# Patient Record
Sex: FEMALE | Race: WHITE | NOT HISPANIC OR LATINO | Employment: OTHER | URBAN - NONMETROPOLITAN AREA
[De-identification: names, ages, dates, MRNs, and addresses within clinical notes are randomized per-mention and may not be internally consistent; named-entity substitution may affect disease eponyms.]

---

## 2019-03-25 ENCOUNTER — OFFICE VISIT (OUTPATIENT)
Dept: OBSTETRICS AND GYNECOLOGY | Facility: CLINIC | Age: 69
End: 2019-03-25

## 2019-03-25 VITALS
WEIGHT: 120 LBS | HEIGHT: 62 IN | SYSTOLIC BLOOD PRESSURE: 98 MMHG | DIASTOLIC BLOOD PRESSURE: 60 MMHG | BODY MASS INDEX: 22.08 KG/M2

## 2019-03-25 DIAGNOSIS — N95.2 ATROPHIC VULVOVAGINITIS: ICD-10-CM

## 2019-03-25 DIAGNOSIS — Z12.31 BREAST CANCER SCREENING BY MAMMOGRAM: ICD-10-CM

## 2019-03-25 DIAGNOSIS — Z01.419 ENCOUNTER FOR GYNECOLOGICAL EXAMINATION WITH PAPANICOLAOU SMEAR OF CERVIX: Primary | ICD-10-CM

## 2019-03-25 DIAGNOSIS — N64.59 OTHER SIGNS AND SYMPTOMS IN BREAST: ICD-10-CM

## 2019-03-25 DIAGNOSIS — N64.4 PAIN OF LEFT BREAST: ICD-10-CM

## 2019-03-25 PROCEDURE — 87624 HPV HI-RISK TYP POOLED RSLT: CPT | Performed by: OBSTETRICS & GYNECOLOGY

## 2019-03-25 PROCEDURE — G0123 SCREEN CERV/VAG THIN LAYER: HCPCS | Performed by: OBSTETRICS & GYNECOLOGY

## 2019-03-25 RX ORDER — LEVALBUTEROL TARTRATE 45 MCG
HFA AEROSOL WITH ADAPTER (GRAM) INHALATION
Refills: 1 | COMMUNITY
Start: 2018-12-18

## 2019-03-25 RX ORDER — ESCITALOPRAM OXALATE 10 MG/1
TABLET ORAL
Refills: 3 | COMMUNITY
Start: 2019-03-21

## 2019-03-27 LAB
GEN CATEG CVX/VAG CYTO-IMP: NORMAL
LAB AP CASE REPORT: NORMAL
LAB AP GYN ADDITIONAL INFORMATION: NORMAL
LAB AP GYN OTHER FINDINGS: NORMAL
PATH INTERP SPEC-IMP: NORMAL
STAT OF ADQ CVX/VAG CYTO-IMP: NORMAL

## 2019-03-30 PROBLEM — Z12.31 BREAST CANCER SCREENING BY MAMMOGRAM: Status: ACTIVE | Noted: 2019-03-30

## 2019-03-30 PROBLEM — N64.4 PAIN OF LEFT BREAST: Status: ACTIVE | Noted: 2019-03-30

## 2019-03-30 PROBLEM — N95.2 ATROPHIC VULVOVAGINITIS: Status: ACTIVE | Noted: 2019-03-30

## 2019-03-30 PROBLEM — N64.59 OTHER SIGNS AND SYMPTOMS IN BREAST: Status: ACTIVE | Noted: 2019-03-30

## 2019-03-30 PROBLEM — Z01.419 ENCOUNTER FOR GYNECOLOGICAL EXAMINATION WITH PAPANICOLAOU SMEAR OF CERVIX: Status: ACTIVE | Noted: 2019-03-30

## 2019-03-30 LAB — HPV I/H RISK 4 DNA CVX QL PROBE+SIG AMP: NEGATIVE

## 2019-03-31 NOTE — PROGRESS NOTES
Leah Jessica is a 69 y.o. y/o female.     Chief Complaint is here requesting Pap smear    HPI:   69 y.o. y/o .  Patient is here requesting Pap smear she is status post hysterectomy current recommendations reviewed.  She is having some dyspareunia he complains of pain in her left breast without mass for about 6 weeks          Review of Systems   ROS:  CNS: No history of brain malignancy  HEENT: No history of throat cancer  Eye: No history of retinal cancer  Pulmonary: No history of lung cancer                                                                                 Cardiovascular: No history of cardiac tumors  Gastrointestinal: No history of small bowel tumors  Renal: No history of kidney  Musculoskeletal: No history of smooth muscle tumors  Lymphatics: No history of of Hodgkin's disease  Endocrine: No history of thyroid malignancy    The following portions of the patient's history were reviewed and updated as appropriate: allergies, current medications, past family history, past medical history, past social history, past surgical history and problem list.    Allergies   Allergen Reactions   • Avelox [Moxifloxacin Hcl] Anaphylaxis   • Albuterol Other (See Comments)     Hurts heart per patient   • Biaxin [Clarithromycin] Nausea And Vomiting   • Contrast Dye Itching     IVP Dye   • Tape Hives   • Clindamycin/Lincomycin Rash   • Floxin Otic [Ofloxacin] Rash   • Histamine Rash        Outpatient Medications Prior to Visit   Medication Sig Dispense Refill   • escitalopram (LEXAPRO) 10 MG tablet   3   • XOPENEX HFA 45 MCG/ACT inhaler   1     No facility-administered medications prior to visit.         The patient has a family history of   Family History   Problem Relation Age of Onset   • Breast cancer Sister         Past Medical History:   Diagnosis Date   • Breast cyst    • Fibrocystic breast         OB History      Para Term  AB Living    2 2 2          SAB TAB Ectopic Molar Multiple Live  "Births                          Social History     Socioeconomic History   • Marital status:      Spouse name: Not on file   • Number of children: Not on file   • Years of education: Not on file   • Highest education level: Not on file        Past Surgical History:   Procedure Laterality Date   • BREAST CYST EXCISION     • HYSTERECTOMY          Patient Active Problem List   Diagnosis   • Atrophic vulvovaginitis   • Breast cancer screening by mammogram   • Encounter for gynecological examination with Papanicolaou smear of cervix   • Pain of left breast   • Other signs and symptoms in breast         Documented Vitals    03/25/19 1155   BP: 98/60   Weight: 54.4 kg (120 lb)   Height: 157.5 cm (62\")        Body mass index is 21.95 kg/m².    Physical Exam  Constitutional: Appears to be in no acute distress; Eyes: sclera normal; Endocrine system: thyroid palpate is normal; Pulmonary system: lungs clear; Cardiovascular system: heart regular rate and rhythm; Gastrointestinal system: abdomen soft nontender, active bowel sounds; Urologic system: CVA negative; Psychiatric: appropriate insight; Neurologic: gait within normal limits female genital system external genitalia atrophic, vagina atrophic urethra atrophic bladder palpates is nontender, cuff well-healed, uterus not palpable, surgically absent right ovary not palpable left ovary not palpable, just I can find no masses in either breast there is some diffuse tenderness in the left breast    Assessment        Diagnosis Plan   1. Encounter for gynecological examination with Papanicolaou smear of cervix  Liquid-based Pap Smear, Screening    HPV DNA Probe, Direct - ThinPrep Vial,    HPV DNA Probe, Direct - ThinPrep Vial, Cervix   2. Breast cancer screening by mammogram  Mammo Screening Digital Tomosynthesis Bilateral With CAD    US Breast Left Complete   3. Pain of left breast     4. Other signs and symptoms in breast   US Breast Left Complete   5. Atrophic " vulvovaginitis           Plan       No orders of the defined types were placed in this encounter.          This document has been electronically signed by Aníbal Hendrickson MD on March 30, 2019 8:46 PM

## 2020-05-06 ENCOUNTER — OFFICE VISIT (OUTPATIENT)
Dept: OBSTETRICS AND GYNECOLOGY | Facility: CLINIC | Age: 70
End: 2020-05-06

## 2020-05-06 VITALS
DIASTOLIC BLOOD PRESSURE: 62 MMHG | HEIGHT: 62 IN | SYSTOLIC BLOOD PRESSURE: 98 MMHG | WEIGHT: 123 LBS | BODY MASS INDEX: 22.63 KG/M2

## 2020-05-06 DIAGNOSIS — N95.2 ATROPHIC VULVOVAGINITIS: Primary | ICD-10-CM

## 2020-05-06 PROCEDURE — 99213 OFFICE O/P EST LOW 20 MIN: CPT | Performed by: OBSTETRICS & GYNECOLOGY

## 2020-05-06 RX ORDER — ESTRADIOL 10 UG/1
1 INSERT VAGINAL 2 TIMES WEEKLY
Qty: 8 TABLET | Refills: 1 | Status: SHIPPED | OUTPATIENT
Start: 2020-05-07 | End: 2020-06-06

## 2020-05-06 RX ORDER — LEVOTHYROXINE SODIUM 75 MCG
75 TABLET ORAL DAILY
COMMUNITY
Start: 2020-03-18 | End: 2021-04-22

## 2020-05-06 RX ORDER — ZOLPIDEM TARTRATE 10 MG/1
10 TABLET ORAL
COMMUNITY
Start: 2020-03-04

## 2020-05-06 RX ORDER — CETIRIZINE HYDROCHLORIDE 10 MG/1
10 TABLET ORAL DAILY
COMMUNITY

## 2020-05-06 RX ORDER — CHLORAL HYDRATE 500 MG
1000 CAPSULE ORAL
COMMUNITY

## 2020-05-06 RX ORDER — MULTIPLE VITAMINS W/ MINERALS TAB 9MG-400MCG
1 TAB ORAL DAILY
COMMUNITY

## 2020-05-06 RX ORDER — ASPIRIN 81 MG/1
81 TABLET ORAL DAILY
COMMUNITY

## 2020-05-07 NOTE — PROGRESS NOTES
Leah Jessica is a 70 y.o. y/o female.     Chief Complaint: Vaginal dryness    HPI:   70 y.o. .  No LMP recorded. Patient is postmenopausal..  Patient complains of dryness in the vagina.  The dryness in the vagina is constant it is been getting progressively worse over about the last 6 months nothing seems to make it better she is tried Vaseline.  It makes intercourse very difficult          Review of Systems   ROS:  CNS: No history of brain malignancy.  HEENT: No history of throat cancer.  Eye: No history of retinal cancer.  Pulmonary: No history of lung cancer.                                                                                 Cardiovascular: No history of cardiac tumors.  Gastrointestinal: No history of small bowel tumors.  Renal: No history of kidney malignancy.  Musculoskeletal: No history of smooth muscle tumors.  Lymphatics: No history of Hodgkin's disease.  Endocrine: No history of thyroid malignancy.    The following portions of the patient's history were reviewed and updated as appropriate: allergies, current medications, past family history, past medical history, past social history, past surgical history and problem list.    Allergies   Allergen Reactions   • Avelox [Moxifloxacin Hcl] Anaphylaxis   • Albuterol Other (See Comments)     Hurts heart per patient   • Biaxin [Clarithromycin] Nausea And Vomiting   • Contrast Dye Itching     IVP Dye   • Tape Hives   • Clindamycin/Lincomycin Rash   • Floxin Otic [Ofloxacin] Rash   • Histamine Rash        Outpatient Medications Prior to Visit   Medication Sig Dispense Refill   • aspirin 81 MG EC tablet Take 81 mg by mouth Daily.     • cetirizine (zyrTEC) 10 MG tablet Take 10 mg by mouth Daily.     • Multiple Vitamins-Minerals (MULTIVITAMIN WITH MINERALS) tablet tablet Take 1 tablet by mouth Daily.     • Omega-3 Fatty Acids (FISH OIL) 1000 MG capsule capsule Take 1,000 mg by mouth Daily With Breakfast.     • escitalopram (LEXAPRO) 10 MG  "tablet   3   • SYNTHROID 75 MCG tablet Take 75 mcg by mouth Daily.     • XOPENEX HFA 45 MCG/ACT inhaler   1   • zolpidem (AMBIEN) 10 MG tablet Take 10 mg by mouth every night at bedtime.       No facility-administered medications prior to visit.         The patient has a family history of   Family History   Problem Relation Age of Onset   • Breast cancer Sister         Past Medical History:   Diagnosis Date   • Breast cyst    • Fibrocystic breast         OB History        2    Para   2    Term   2            AB        Living           SAB        TAB        Ectopic        Molar        Multiple        Live Births                     Social History     Socioeconomic History   • Marital status:      Spouse name: Not on file   • Number of children: Not on file   • Years of education: Not on file   • Highest education level: Not on file   Tobacco Use   • Smoking status: Never Smoker   • Smokeless tobacco: Never Used   Substance and Sexual Activity   • Alcohol use: Never     Frequency: Never   • Drug use: Never   • Sexual activity: Not Currently        Past Surgical History:   Procedure Laterality Date   • BREAST CYST EXCISION     • HYSTERECTOMY          Patient Active Problem List   Diagnosis   • Atrophic vulvovaginitis   • Breast cancer screening by mammogram   • Encounter for gynecological examination with Papanicolaou smear of cervix   • Pain of left breast   • Other signs and symptoms in breast         Documented Vitals    20 1141   BP: 98/62   Weight: 55.8 kg (123 lb)   Height: 157.5 cm (62\")   PainSc: 0-No pain        Body mass index is 22.5 kg/m².    Physical Exam  Constitutional: Appears to be in no acute distress; Eyes: sclera normal; Endocrine system: thyroid palpate is normal; Pulmonary system: lungs clear; Cardiovascular system: heart regular rate and rhythm; Gastrointestinal system: abdomen soft nontender, active bowel sounds; Urologic system: CVA negative; Psychiatric: appropriate " insight; Neurologic: gait within normal limits      Laboratory Data:   No results for input(s): GLUCOSE, BUN, CREATININE, NA, K, CL, CO2, CALCIUM, PROTEINTOT, ALBUMIN, ALT, AST, ALKPHOS, BILITOT, EGFRIFNONA, GLOB, AGRATIO, BCR, ANIONGAP, BILIDIR, INDBILI in the last 47121 hours.  No results for input(s): WBC, RBC, HGB, HCT, MCV, MCH, MCHC, RDW, RDWSD, MPV, PLT in the last 37307 hours.  No results for input(s): HCGQUAL in the last 48899 hours.    Assessment        Diagnosis Plan   1. Atrophic vulvovaginitis   after long review of risks benefits alternatives were gone try Vagifem         Plan         New Medications Ordered This Visit   Medications   • estradiol (Vagifem) 10 MCG tablet vaginal tablet     Sig: Insert 1 tablet into the vagina 2 (Two) Times a Week for 30 days.     Dispense:  8 tablet     Refill:  1             This document has been electronically signed by Aníbal Hendrickson MD on May 6, 2020 23:51    Please note that portions of this note were completed with a voice recognition program.

## 2020-05-13 DIAGNOSIS — Z12.31 BREAST CANCER SCREENING BY MAMMOGRAM: Primary | ICD-10-CM

## 2020-09-03 ENCOUNTER — TELEPHONE (OUTPATIENT)
Dept: OBSTETRICS AND GYNECOLOGY | Facility: CLINIC | Age: 70
End: 2020-09-03

## 2020-09-03 RX ORDER — ESTRADIOL 10 UG/1
1 INSERT VAGINAL 2 TIMES WEEKLY
Qty: 8 TABLET | Refills: 12 | Status: SHIPPED | OUTPATIENT
Start: 2020-09-03 | End: 2021-10-06 | Stop reason: SDUPTHER

## 2020-09-03 NOTE — TELEPHONE ENCOUNTER
PATIENT IS WANTING TO GET  REFILL ON HER MEDICATIONS   estradiol (Vagifem) 10 MCG tablet vaginal tablet        Sig: Insert 1 tablet into the vagina 2 (Two) Times a Week for 30 days.       Dispense:  8 tablet       Refill:  1     PT WANTED IT TO BE SENT TO [facility]

## 2021-01-06 ENCOUNTER — TELEPHONE (OUTPATIENT)
Dept: OBSTETRICS AND GYNECOLOGY | Facility: CLINIC | Age: 71
End: 2021-01-06

## 2021-01-06 NOTE — TELEPHONE ENCOUNTER
Patient called stating she felt liked something popped a few months ago that it did not hurt or anything but since then she has been experiencing some irritation. Patient states she has not had any bleeding or anything like that just some irritation the best number to reach her at is [phone]

## 2021-02-22 ENCOUNTER — OFFICE VISIT (OUTPATIENT)
Dept: OBSTETRICS AND GYNECOLOGY | Facility: CLINIC | Age: 71
End: 2021-02-22

## 2021-02-22 VITALS
WEIGHT: 122.2 LBS | BODY MASS INDEX: 21.65 KG/M2 | DIASTOLIC BLOOD PRESSURE: 60 MMHG | HEIGHT: 63 IN | SYSTOLIC BLOOD PRESSURE: 104 MMHG

## 2021-02-22 DIAGNOSIS — R10.2 FEMALE PELVIC PAIN: Primary | ICD-10-CM

## 2021-02-22 DIAGNOSIS — N95.2 ATROPHIC VULVOVAGINITIS: ICD-10-CM

## 2021-02-22 PROCEDURE — 99213 OFFICE O/P EST LOW 20 MIN: CPT | Performed by: OBSTETRICS & GYNECOLOGY

## 2021-02-22 RX ORDER — FLUTICASONE PROPIONATE 50 MCG
SPRAY, SUSPENSION (ML) NASAL EVERY 24 HOURS
COMMUNITY

## 2021-02-23 PROBLEM — R10.2 FEMALE PELVIC PAIN: Status: ACTIVE | Noted: 2021-02-23

## 2021-02-23 NOTE — PROGRESS NOTES
Leah Jessica is a 71 y.o. y/o female.     Chief Complaint: Pelvic pain, vaginal dryness    HPI:   71 y.o. .  No LMP recorded. Patient is postmenopausal..  Complains of pelvic pain and vaginal dryness not sexually active for some time.  Concerned about status of ovaries no familial history of ovarian cancer          Review of Systems     Constitutional: Denies night sweats    HENT: No hearing changes, denies ear pain    Eye: No eye pain; no foreign body in eye    Pulmonary: No hemoptysis    Cardiovascular: No claudication    GI: No hematemesis    Musculoskeletal: No arthralgias, no joint swelling    Endocrine: No polydipsia or polyuria    Hematologic: Denies any free bleeding    Psychiatric: Denies any delusions    The following portions of the patient's history were reviewed and updated as appropriate: allergies, current medications, past family history, past medical history, past social history, past surgical history and problem list.    Allergies   Allergen Reactions   • Avelox [Moxifloxacin Hcl] Anaphylaxis   • Albuterol Other (See Comments)     Hurts heart per patient   • Biaxin [Clarithromycin] Nausea And Vomiting   • Contrast Dye Itching     IVP Dye   • Tape Hives   • Clindamycin/Lincomycin Rash   • Floxin Otic [Ofloxacin] Rash   • Histamine Rash        Outpatient Medications Prior to Visit   Medication Sig Dispense Refill   • aspirin 81 MG EC tablet Take 81 mg by mouth Daily.     • cetirizine (zyrTEC) 10 MG tablet Take 10 mg by mouth Daily.     • escitalopram (LEXAPRO) 10 MG tablet   3   • estradiol (VAGIFEM) 10 MCG tablet vaginal tablet Insert 1 tablet into the vagina 2 (Two) Times a Week. 8 tablet 12   • fluticasone (Flonase Allergy Relief) 50 MCG/ACT nasal spray Daily.     • Lysine 500 MG capsule Daily.     • Multiple Vitamins-Minerals (MULTIVITAMIN ADULT EXTRA C PO) 1 tab     • Multiple Vitamins-Minerals (MULTIVITAMIN WITH MINERALS) tablet tablet Take 1 tablet by mouth Daily.     • Omega-3  "Fatty Acids (FISH OIL) 1000 MG capsule capsule Take 1,000 mg by mouth Daily With Breakfast.     • SYNTHROID 75 MCG tablet Take 75 mcg by mouth Daily.     • XOPENEX HFA 45 MCG/ACT inhaler   1   • zolpidem (AMBIEN) 10 MG tablet Take 10 mg by mouth every night at bedtime.       No facility-administered medications prior to visit.         The patient has a family history of   Family History   Problem Relation Age of Onset   • Breast cancer Sister         Past Medical History:   Diagnosis Date   • Breast cyst    • Fibrocystic breast         OB History        2    Para   2    Term   2            AB        Living           SAB        TAB        Ectopic        Molar        Multiple        Live Births                     Social History     Socioeconomic History   • Marital status:      Spouse name: Not on file   • Number of children: Not on file   • Years of education: Not on file   • Highest education level: Not on file   Tobacco Use   • Smoking status: Never Smoker   • Smokeless tobacco: Never Used   Substance and Sexual Activity   • Alcohol use: Never     Frequency: Never   • Drug use: Never   • Sexual activity: Not Currently        Past Surgical History:   Procedure Laterality Date   • BREAST CYST EXCISION     • HYSTERECTOMY          Patient Active Problem List   Diagnosis   • Atrophic vulvovaginitis   • Breast cancer screening by mammogram   • Encounter for gynecological examination with Papanicolaou smear of cervix   • Pain of left breast   • Other signs and symptoms in breast    • Female pelvic pain        Documented Vitals    21 1121   BP: 104/60   Weight: 55.4 kg (122 lb 3.2 oz)   Height: 160 cm (63\")        Body mass index is 21.65 kg/m².    Physical Exam  Constitutional: Appears to be in no acute distress; Eyes: sclera normal; Endocrine system: thyroid palpate is normal; Pulmonary system: lungs clear; Cardiovascular system: heart regular rate and rhythm; Gastrointestinal system: abdomen " soft nontender, active bowel sounds; Urologic system: CVA negative; Psychiatric: appropriate insight; Neurologic: gait within normal limits     Laboratory Data:   No results for input(s): GLUCOSE, BUN, CREATININE, NA, K, CL, CO2, CALCIUM, PROTEINTOT, ALBUMIN, ALT, AST, ALKPHOS, BILITOT, EGFRIFNONA, GLOB, AGRATIO, BCR, ANIONGAP, BILIDIR, INDBILI in the last 93671 hours.  No results for input(s): WBC, RBC, HGB, HCT, MCV, MCH, MCHC, RDW, RDWSD, MPV, PLT in the last 49900 hours.  No results for input(s): HCGQUAL in the last 47413 hours.    Assessment        Diagnosis Plan   1. Female pelvic pain  US pelvis complete   2. Atrophic vulvovaginitis        Is using Vagifem 1/2 tablet twice a week encouraged to go to a whole tablet twice a week.  And work on a plan for an ultrasound in follow-up.  Reviewed ultrasound of pelvis from 2013 in care everywhere at [facility] which had been negative    Plan       No orders of the defined types were placed in this encounter.            This document has been electronically signed by Aníbal Hendrickson MD on February 23, 2021 17:12 CST    Please note that portions of this note were completed with a voice recognition program.

## 2021-04-22 ENCOUNTER — OFFICE VISIT (OUTPATIENT)
Dept: OBSTETRICS AND GYNECOLOGY | Facility: CLINIC | Age: 71
End: 2021-04-22

## 2021-04-22 VITALS
BODY MASS INDEX: 21.62 KG/M2 | HEIGHT: 63 IN | DIASTOLIC BLOOD PRESSURE: 60 MMHG | WEIGHT: 122 LBS | SYSTOLIC BLOOD PRESSURE: 98 MMHG

## 2021-04-22 DIAGNOSIS — N95.2 ATROPHIC VULVOVAGINITIS: ICD-10-CM

## 2021-04-22 DIAGNOSIS — R10.2 FEMALE PELVIC PAIN: Primary | ICD-10-CM

## 2021-04-22 PROBLEM — I10 BENIGN ESSENTIAL HYPERTENSION: Status: ACTIVE | Noted: 2020-09-15

## 2021-04-22 PROBLEM — R73.03 PREDIABETES: Status: ACTIVE | Noted: 2021-04-22

## 2021-04-22 PROBLEM — M81.0 OSTEOPOROSIS: Status: ACTIVE | Noted: 2021-04-22

## 2021-04-22 PROBLEM — G90.01 CAROTID SINUS SYNCOPE: Status: ACTIVE | Noted: 2021-04-22

## 2021-04-22 PROBLEM — F32.9 MAJOR DEPRESSION, SINGLE EPISODE: Status: ACTIVE | Noted: 2021-04-22

## 2021-04-22 PROBLEM — E78.5 DYSLIPIDEMIA: Status: ACTIVE | Noted: 2021-04-22

## 2021-04-22 PROBLEM — H90.3 BILATERAL SENSORINEURAL HEARING LOSS: Status: ACTIVE | Noted: 2021-04-22

## 2021-04-22 PROBLEM — E03.9 ACQUIRED HYPOTHYROIDISM: Status: ACTIVE | Noted: 2020-09-15

## 2021-04-22 PROBLEM — E78.5 HYPERLIPIDEMIA: Status: ACTIVE | Noted: 2020-09-15

## 2021-04-22 PROBLEM — Z78.0 POSTMENOPAUSAL STATE: Status: ACTIVE | Noted: 2021-04-22

## 2021-04-22 PROBLEM — J45.20 MILD INTERMITTENT ASTHMA: Status: ACTIVE | Noted: 2021-04-22

## 2021-04-22 PROCEDURE — 99212 OFFICE O/P EST SF 10 MIN: CPT | Performed by: OBSTETRICS & GYNECOLOGY

## 2021-04-22 RX ORDER — LEVOTHYROXINE SODIUM 88 MCG
88 TABLET ORAL
COMMUNITY
Start: 2021-03-09

## 2021-04-23 NOTE — PROGRESS NOTES
Leah Jessica is a 71 y.o. y/o female.     Chief Complaint: Problem follow-up question pimples around vagina    HPI:   71 y.o. .  No LMP recorded. Patient is postmenopausal..  Patient had ultrasound for pelvic pain of the ovaries were not visible I reassured her that this is usually a finding with atrophic ovaries.    She is concerned about lesions of the vulva.          Review of Systems     Constitutional: Denies night sweats    HENT: No hearing changes, denies ear pain    Eye: No eye pain; no foreign body in eye    Pulmonary: No hemoptysis    Cardiovascular: No claudication    GI: No hematemesis    Musculoskeletal: No arthralgias, no joint swelling    Endocrine: No polydipsia or polyuria    Hematologic: Denies any free bleeding    Psychiatric: Denies any delusions    The following portions of the patient's history were reviewed and updated as appropriate: allergies, current medications, past family history, past medical history, past social history, past surgical history and problem list.    Allergies   Allergen Reactions   • Avelox [Moxifloxacin Hcl] Anaphylaxis   • Iodinated Diagnostic Agents Hives   • Ceftriaxone Rash and Hives   • Albuterol Other (See Comments)     Hurts heart per patient   • Biaxin [Clarithromycin] Nausea And Vomiting   • Contrast Dye Itching     IVP Dye   • Tape Hives   • Clindamycin/Lincomycin Rash   • Floxin Otic [Ofloxacin] Rash   • Histamine Rash        Outpatient Medications Prior to Visit   Medication Sig Dispense Refill   • aspirin 81 MG EC tablet Take 81 mg by mouth Daily.     • cetirizine (zyrTEC) 10 MG tablet Take 10 mg by mouth Daily.     • escitalopram (LEXAPRO) 10 MG tablet   3   • estradiol (VAGIFEM) 10 MCG tablet vaginal tablet Insert 1 tablet into the vagina 2 (Two) Times a Week. 8 tablet 12   • fluticasone (Flonase Allergy Relief) 50 MCG/ACT nasal spray Daily.     • Lysine 500 MG capsule Daily.     • Multiple Vitamins-Minerals (MULTIVITAMIN WITH MINERALS) tablet  tablet Take 1 tablet by mouth Daily.     • Omega-3 Fatty Acids (FISH OIL) 1000 MG capsule capsule Take 1,000 mg by mouth Daily With Breakfast.     • Synthroid 88 MCG tablet Take 88 mcg by mouth Before Breakfast.     • VITAMIN D PO Take 1 tablet by mouth Daily.     • XOPENEX HFA 45 MCG/ACT inhaler   1   • Zinc Acetate, Oral, (ZINC ACETATE PO) Take 1 tablet by mouth As Needed.     • zolpidem (AMBIEN) 10 MG tablet Take 10 mg by mouth every night at bedtime.     • Multiple Vitamins-Minerals (MULTIVITAMIN ADULT EXTRA C PO) 1 tab     • SYNTHROID 75 MCG tablet Take 75 mcg by mouth Daily.       No facility-administered medications prior to visit.        The patient has a family history of   Family History   Problem Relation Age of Onset   • Breast cancer Sister         Past Medical History:   Diagnosis Date   • Breast cyst    • Fibrocystic breast         OB History        2    Para   2    Term   2            AB        Living           SAB        TAB        Ectopic        Molar        Multiple        Live Births                     Social History     Socioeconomic History   • Marital status:      Spouse name: Not on file   • Number of children: Not on file   • Years of education: Not on file   • Highest education level: Not on file   Tobacco Use   • Smoking status: Never Smoker   • Smokeless tobacco: Never Used   Substance and Sexual Activity   • Alcohol use: Never   • Drug use: Never   • Sexual activity: Not Currently        Past Surgical History:   Procedure Laterality Date   • BREAST CYST EXCISION     • HYSTERECTOMY          Patient Active Problem List   Diagnosis   • Atrophic vulvovaginitis   • Breast cancer screening by mammogram   • Encounter for gynecological examination with Papanicolaou smear of cervix   • Pain of left breast   • Other signs and symptoms in breast    • Female pelvic pain   • Acquired hypothyroidism   • Benign essential hypertension   • Bilateral sensorineural hearing loss   •  "Carotid sinus syncope   • Dyslipidemia   • Hyperlipidemia   • Major depression, single episode   • Mild intermittent asthma   • Osteoporosis   • Postmenopausal state   • Prediabetes        Documented Vitals    04/22/21 1103   BP: 98/60   Weight: 55.3 kg (122 lb)   Height: 160 cm (63\")   PainSc: 0-No pain        Body mass index is 21.61 kg/m².    Physical Exam  Constitutional: Appears to be in no acute distress; Eyes: Sclerae normal; Endocrine system: Thyroid palpation is normal; Pulmonary system: Lungs clear; Cardiovascular system: Heart regular rate and rhythm; Gastrointestinal system: Abdomen soft and nontender, active bowel sounds; Urologic system: CVA negative; Psychiatric: Appropriate insight; Neurologic: Gait within normal limits there is some atrophy of the vulva and there may be some minor dermatologic lesions none number are minimal at this point to Jose and I have gone over this with her I do not think any of them rep present a of a boiler for her uncle they could be very small sebaceous cyst I do not think any of them need to be excised    Laboratory Data:   No results for input(s): GLUCOSE, BUN, CREATININE, NA, K, CL, CO2, CALCIUM, PROTEINTOT, ALBUMIN, ALT, AST, ALKPHOS, BILITOT, EGFRIFNONA, GLOB, AGRATIO, BCR, ANIONGAP, BILIDIR, INDBILI in the last 05954 hours.  No results for input(s): WBC, RBC, HGB, HCT, MCV, MCH, MCHC, RDW, RDWSD, MPV, PLT in the last 85044 hours.  No results for input(s): HCGQUAL in the last 76932 hours.    Assessment        Diagnosis Plan   1. Female pelvic pain   at this point would no further work-up if pain worsens to let me know   2. Atrophic vulvovaginitis   warm compresses or sitz bath's         Plan       No orders of the defined types were placed in this encounter.            This document has been electronically signed by Aníbal Hendrickson MD on April 23, 2021 13:06 CDT    Please note that portions of this note were completed with a voice recognition program.           "

## 2021-10-06 ENCOUNTER — TELEPHONE (OUTPATIENT)
Dept: OBSTETRICS AND GYNECOLOGY | Facility: CLINIC | Age: 71
End: 2021-10-06

## 2021-10-06 RX ORDER — ESTRADIOL 10 UG/1
1 INSERT VAGINAL 2 TIMES WEEKLY
Qty: 8 TABLET | Refills: 12 | Status: SHIPPED | OUTPATIENT
Start: 2021-10-07

## 2021-10-06 NOTE — TELEPHONE ENCOUNTER
Pt is leaving for vacation today and she is needing a refill on her Hormone meds. She uses [facility] pharmacy.    Thanks .

## 2021-11-29 ENCOUNTER — TELEPHONE (OUTPATIENT)
Dept: OBSTETRICS AND GYNECOLOGY | Facility: CLINIC | Age: 71
End: 2021-11-29

## 2021-11-29 NOTE — TELEPHONE ENCOUNTER
PLEASE GIVE THIS PATIENT A CALL BACK REGARDING HER CURRENT MEDICATION AND INSURANCE CHANGE. BEST NUMBER TO REACH HER AT IS THE NUMBER LISTED ON FILE.  THANKS

## 2021-12-03 ENCOUNTER — TELEPHONE (OUTPATIENT)
Dept: OBSTETRICS AND GYNECOLOGY | Facility: CLINIC | Age: 71
End: 2021-12-03

## 2021-12-03 NOTE — TELEPHONE ENCOUNTER
PATIENT IS CHANGING INSURANCE AND THEY ARE ASKING IF SHE CAN TAKE 0.5 MG INSTEAD OF OF 2 OF 5 MCG OF ESTRADIOL. I SPOKE WITH DR. CHANG AND HE SPOKE WITH PATIENT.

## 2022-04-25 DIAGNOSIS — Z12.31 ENCOUNTER FOR SCREENING MAMMOGRAM FOR MALIGNANT NEOPLASM OF BREAST: Primary | ICD-10-CM
